# Patient Record
Sex: FEMALE | Race: WHITE | Employment: PART TIME | URBAN - METROPOLITAN AREA
[De-identification: names, ages, dates, MRNs, and addresses within clinical notes are randomized per-mention and may not be internally consistent; named-entity substitution may affect disease eponyms.]

---

## 2017-01-05 PROCEDURE — 86403 PARTICLE AGGLUT ANTBDY SCRN: CPT | Performed by: INTERNAL MEDICINE

## 2017-06-20 PROCEDURE — 87086 URINE CULTURE/COLONY COUNT: CPT | Performed by: FAMILY MEDICINE

## 2017-06-26 PROCEDURE — 88305 TISSUE EXAM BY PATHOLOGIST: CPT | Performed by: INTERNAL MEDICINE

## 2017-07-31 PROBLEM — N92.6 IRREGULAR MENSTRUAL CYCLE: Status: ACTIVE | Noted: 2017-07-31

## 2017-07-31 PROBLEM — N94.6 DYSMENORRHEA: Status: ACTIVE | Noted: 2017-07-31

## 2017-07-31 PROCEDURE — 88175 CYTOPATH C/V AUTO FLUID REDO: CPT | Performed by: OBSTETRICS & GYNECOLOGY

## 2017-11-01 PROBLEM — M54.50 CHRONIC BILATERAL LOW BACK PAIN WITHOUT SCIATICA: Status: ACTIVE | Noted: 2017-11-01

## 2017-11-01 PROBLEM — M25.552 LEFT HIP PAIN: Status: ACTIVE | Noted: 2017-11-01

## 2017-11-01 PROBLEM — G89.29 CHRONIC BILATERAL LOW BACK PAIN WITHOUT SCIATICA: Status: ACTIVE | Noted: 2017-11-01

## 2018-06-04 PROCEDURE — 86812 HLA TYPING A B OR C: CPT | Performed by: INTERNAL MEDICINE

## 2018-06-04 PROCEDURE — 83516 IMMUNOASSAY NONANTIBODY: CPT | Performed by: INTERNAL MEDICINE

## 2018-06-04 PROCEDURE — 86256 FLUORESCENT ANTIBODY TITER: CPT | Performed by: INTERNAL MEDICINE

## 2018-06-04 PROCEDURE — 86334 IMMUNOFIX E-PHORESIS SERUM: CPT | Performed by: INTERNAL MEDICINE

## 2018-06-04 PROCEDURE — 83883 ASSAY NEPHELOMETRY NOT SPEC: CPT | Performed by: INTERNAL MEDICINE

## 2018-06-04 PROCEDURE — 84165 PROTEIN E-PHORESIS SERUM: CPT | Performed by: INTERNAL MEDICINE

## 2018-06-04 PROCEDURE — 86200 CCP ANTIBODY: CPT | Performed by: INTERNAL MEDICINE

## 2018-06-04 PROCEDURE — 82784 ASSAY IGA/IGD/IGG/IGM EACH: CPT | Performed by: INTERNAL MEDICINE

## 2018-06-04 PROCEDURE — 83970 ASSAY OF PARATHORMONE: CPT | Performed by: INTERNAL MEDICINE

## 2018-07-29 PROBLEM — G89.29 CHRONIC RIGHT FLANK PAIN: Status: ACTIVE | Noted: 2018-07-29

## 2018-07-29 PROBLEM — R10.9 CHRONIC RIGHT FLANK PAIN: Status: ACTIVE | Noted: 2018-07-29

## 2018-08-22 PROBLEM — G89.29 CHRONIC BILATERAL LOW BACK PAIN WITHOUT SCIATICA: Status: RESOLVED | Noted: 2017-11-01 | Resolved: 2018-08-22

## 2018-08-22 PROBLEM — M54.50 CHRONIC BILATERAL LOW BACK PAIN WITHOUT SCIATICA: Status: RESOLVED | Noted: 2017-11-01 | Resolved: 2018-08-22

## 2019-04-12 PROCEDURE — 86704 HEP B CORE ANTIBODY TOTAL: CPT | Performed by: INTERNAL MEDICINE

## 2019-04-12 PROCEDURE — 86480 TB TEST CELL IMMUN MEASURE: CPT | Performed by: INTERNAL MEDICINE

## 2019-04-12 PROCEDURE — 80074 ACUTE HEPATITIS PANEL: CPT | Performed by: INTERNAL MEDICINE

## 2022-11-23 ENCOUNTER — OFFICE VISIT (OUTPATIENT)
Dept: OBGYN CLINIC | Facility: CLINIC | Age: 32
End: 2022-11-23
Payer: COMMERCIAL

## 2022-11-23 VITALS
HEART RATE: 123 BPM | SYSTOLIC BLOOD PRESSURE: 127 MMHG | HEIGHT: 67 IN | WEIGHT: 192 LBS | DIASTOLIC BLOOD PRESSURE: 83 MMHG | BODY MASS INDEX: 30.13 KG/M2

## 2022-11-23 DIAGNOSIS — N91.4 SECONDARY OLIGOMENORRHEA: ICD-10-CM

## 2022-11-23 DIAGNOSIS — N94.6 DYSMENORRHEA: ICD-10-CM

## 2022-11-23 DIAGNOSIS — Z01.419 ENCOUNTER FOR ANNUAL ROUTINE GYNECOLOGICAL EXAMINATION: Primary | ICD-10-CM

## 2022-11-23 PROCEDURE — 87624 HPV HI-RISK TYP POOLED RSLT: CPT | Performed by: STUDENT IN AN ORGANIZED HEALTH CARE EDUCATION/TRAINING PROGRAM

## 2022-11-23 PROCEDURE — 3074F SYST BP LT 130 MM HG: CPT | Performed by: STUDENT IN AN ORGANIZED HEALTH CARE EDUCATION/TRAINING PROGRAM

## 2022-11-23 PROCEDURE — 99204 OFFICE O/P NEW MOD 45 MIN: CPT | Performed by: STUDENT IN AN ORGANIZED HEALTH CARE EDUCATION/TRAINING PROGRAM

## 2022-11-23 PROCEDURE — 3008F BODY MASS INDEX DOCD: CPT | Performed by: STUDENT IN AN ORGANIZED HEALTH CARE EDUCATION/TRAINING PROGRAM

## 2022-11-23 PROCEDURE — 99385 PREV VISIT NEW AGE 18-39: CPT | Performed by: STUDENT IN AN ORGANIZED HEALTH CARE EDUCATION/TRAINING PROGRAM

## 2022-11-23 PROCEDURE — 3079F DIAST BP 80-89 MM HG: CPT | Performed by: STUDENT IN AN ORGANIZED HEALTH CARE EDUCATION/TRAINING PROGRAM

## 2022-11-23 RX ORDER — DROSPIRENONE AND ETHINYL ESTRADIOL 0.03MG-3MG
1 KIT ORAL DAILY
Qty: 28 TABLET | Refills: 12 | Status: SHIPPED | OUTPATIENT
Start: 2022-11-23 | End: 2023-11-23

## 2022-11-25 LAB — HPV I/H RISK 1 DNA SPEC QL NAA+PROBE: NEGATIVE

## 2022-11-30 ENCOUNTER — LAB ENCOUNTER (OUTPATIENT)
Dept: LAB | Age: 32
End: 2022-11-30
Attending: STUDENT IN AN ORGANIZED HEALTH CARE EDUCATION/TRAINING PROGRAM
Payer: COMMERCIAL

## 2022-11-30 DIAGNOSIS — N91.4 SECONDARY OLIGOMENORRHEA: ICD-10-CM

## 2022-11-30 LAB
DHEA-S SERPL-MCNC: 64.6 UG/DL
EST. AVERAGE GLUCOSE BLD GHB EST-MCNC: 105 MG/DL (ref 68–126)
ESTRADIOL SERPL-MCNC: 52.7 PG/ML
FSH SERPL-ACNC: 6.5 MIU/ML
HBA1C MFR BLD: 5.3 % (ref ?–5.7)
PROLACTIN SERPL-MCNC: 10.2 NG/ML
T4 FREE SERPL-MCNC: 1 NG/DL (ref 0.8–1.7)
TSI SER-ACNC: 2.07 MIU/ML (ref 0.36–3.74)

## 2022-11-30 PROCEDURE — 84403 ASSAY OF TOTAL TESTOSTERONE: CPT | Performed by: STUDENT IN AN ORGANIZED HEALTH CARE EDUCATION/TRAINING PROGRAM

## 2022-11-30 PROCEDURE — 83001 ASSAY OF GONADOTROPIN (FSH): CPT | Performed by: STUDENT IN AN ORGANIZED HEALTH CARE EDUCATION/TRAINING PROGRAM

## 2022-11-30 PROCEDURE — 82627 DEHYDROEPIANDROSTERONE: CPT | Performed by: STUDENT IN AN ORGANIZED HEALTH CARE EDUCATION/TRAINING PROGRAM

## 2022-11-30 PROCEDURE — 84146 ASSAY OF PROLACTIN: CPT | Performed by: STUDENT IN AN ORGANIZED HEALTH CARE EDUCATION/TRAINING PROGRAM

## 2022-11-30 PROCEDURE — 83036 HEMOGLOBIN GLYCOSYLATED A1C: CPT | Performed by: STUDENT IN AN ORGANIZED HEALTH CARE EDUCATION/TRAINING PROGRAM

## 2022-11-30 PROCEDURE — 84443 ASSAY THYROID STIM HORMONE: CPT | Performed by: STUDENT IN AN ORGANIZED HEALTH CARE EDUCATION/TRAINING PROGRAM

## 2022-11-30 PROCEDURE — 84402 ASSAY OF FREE TESTOSTERONE: CPT | Performed by: STUDENT IN AN ORGANIZED HEALTH CARE EDUCATION/TRAINING PROGRAM

## 2022-11-30 PROCEDURE — 82670 ASSAY OF TOTAL ESTRADIOL: CPT | Performed by: STUDENT IN AN ORGANIZED HEALTH CARE EDUCATION/TRAINING PROGRAM

## 2022-11-30 PROCEDURE — 83498 ASY HYDROXYPROGESTERONE 17-D: CPT | Performed by: STUDENT IN AN ORGANIZED HEALTH CARE EDUCATION/TRAINING PROGRAM

## 2022-11-30 PROCEDURE — 84439 ASSAY OF FREE THYROXINE: CPT | Performed by: STUDENT IN AN ORGANIZED HEALTH CARE EDUCATION/TRAINING PROGRAM

## 2022-12-01 ENCOUNTER — PATIENT MESSAGE (OUTPATIENT)
Dept: OBGYN CLINIC | Facility: CLINIC | Age: 32
End: 2022-12-01

## 2022-12-03 LAB — 17-HYDROPROGESTERONE QNT: 110.98 NG/DL

## 2022-12-06 NOTE — TELEPHONE ENCOUNTER
Per Dr. Ihsan Edwards 12/5/22 note regarding kidney function and potassium:  Patient's kidney function is normal, so even with the frequent naproxen use, her kidneys should still be able to regulate her potassium levels fine. OK for pt to start the OCPs. Pt informed okay to continue OCPs per Dr. Chinmay Lopez.  Pt verbalizes understanding.

## 2022-12-07 LAB
TESTOSTERONE, FREE, S: 0.83 NG/DL
TESTOSTERONE, TOTAL, S: 34 NG/DL

## 2023-01-01 ENCOUNTER — PATIENT MESSAGE (OUTPATIENT)
Dept: OBGYN CLINIC | Facility: CLINIC | Age: 33
End: 2023-01-01

## 2023-01-03 NOTE — TELEPHONE ENCOUNTER
From: John Lara  To: Henry Miguel MD  Sent: 1/1/2023 3:28 PM CST  Subject: cramping for 10+ days       Colette had intense menstrual cramping for the past 10 days without a period ( I do think I will get it in a day or two). Gingere taken my 500 mg naproxen twice a day and have not experienced that much relief from it. Are there any other options for birth control pills other than the sandy that I can take? I am still uncomfortable with the sandy since I do take nsaids at least 3-10 times/ week due to my arthritis. Or are there any other options to help the intense cramping? This cramping issue usually happens to me for two weeks before a period (when I get it) and has been getting worse over the past year or so.

## 2023-01-04 RX ORDER — LEVONORGESTREL AND ETHINYL ESTRADIOL 0.15-0.03
1 KIT ORAL DAILY
Qty: 91 TABLET | Refills: 3 | Status: SHIPPED | OUTPATIENT
Start: 2023-01-04

## 2023-01-04 NOTE — TELEPHONE ENCOUNTER
Discussed per Dr. Yuliya Abarca 91-Day 0.15-0.03 MG Oral Tab This one you take for 3 months at a time, designed to skip periods to help reduce cramping. She can even skip the period at the end of the 3 month pill pack, too. Rxed this to pharmacy. Another alternative is the mirena IUD -- will release a much smaller amount of hormone into her bloodstream so theoretically has less side effects on mood, etc. Can take a few months to work but eventually can make periods stop altogether. If interested in that can schedule for IUD placement. Pt plans to try [facility] at this time. RX sent to pharmacy    Patient verbalized understanding, agreed to and intend to comply with plan of care.

## 2023-01-23 ENCOUNTER — TELEPHONE (OUTPATIENT)
Facility: CLINIC | Age: 33
End: 2023-01-23

## 2023-01-23 NOTE — TELEPHONE ENCOUNTER
Spoke with patient. She started new OCP on 1/8/23. Since day 13 to now she has been experiencing severe cramps that come and go through out the day. Was taking aleve without relief. Will route message but in the meantime Instructed to switch to ibuprofen 600 mg every 6 hours with food and keep her bladder empty. Understanding verbalized.

## 2023-01-23 NOTE — TELEPHONE ENCOUNTER
Spoke with patient. She has not noticed any changes in her bowel habits. She is aware some birth control pills can cause bloating/constipation, so cramps may be related to this. Instructed to try gas x to see if that elevates/helps th issue. If the cramps are pretty severe, persistent, and the only new change has been the new birth control, there is the option of just discontinuing the OCPs. Another option for controlling her periods that may have less side effects could be the mirena IUD. She is not interested in the IUD. She would like to try a couple more days of the OCP. If cramping continues will stop. Aware irregular bleeding may occur for up to 3 months. Will call if any additional concerns. Understanding verbalized.

## 2023-01-23 NOTE — TELEPHONE ENCOUNTER
Pt states she is still experiencing really bad cramping by day 13 on BC that OTC pain meds are not helping; pls advise

## 2023-03-20 ENCOUNTER — TELEPHONE (OUTPATIENT)
Facility: CLINIC | Age: 33
End: 2023-03-20

## 2023-03-20 ENCOUNTER — PATIENT MESSAGE (OUTPATIENT)
Dept: OBGYN CLINIC | Facility: CLINIC | Age: 33
End: 2023-03-20

## 2023-03-21 NOTE — TELEPHONE ENCOUNTER
Spoke with pt. I let her know that most times it can take up to 3 months for her body to get use to the new pill but sometimes it may take a little long. Told to continue on the pill as directed to see if it bleeding improves in the next 2-3 months. To call if no improvement and we can get a message to Dr. Jodie Yarbrough to possibly change BCP. To call or go to the ER if soaking a pad an hour, SOB, lightheadedness, or dizziness. Verbalized understanding.

## 2023-04-10 ENCOUNTER — OFFICE VISIT (OUTPATIENT)
Dept: OBGYN CLINIC | Facility: CLINIC | Age: 33
End: 2023-04-10
Payer: COMMERCIAL

## 2023-04-10 VITALS
DIASTOLIC BLOOD PRESSURE: 85 MMHG | HEART RATE: 93 BPM | WEIGHT: 185.19 LBS | HEIGHT: 67 IN | SYSTOLIC BLOOD PRESSURE: 120 MMHG | BODY MASS INDEX: 29.07 KG/M2

## 2023-04-10 DIAGNOSIS — Z30.41 USES ORAL CONTRACEPTION: Primary | ICD-10-CM

## 2023-04-10 DIAGNOSIS — R10.2 ADNEXAL PAIN: ICD-10-CM

## 2023-04-10 DIAGNOSIS — N94.6 DYSMENORRHEA: ICD-10-CM

## 2023-04-10 RX ORDER — NORETHINDRONE ACETATE AND ETHINYL ESTRADIOL 1MG-20(21)
1 KIT ORAL DAILY
Qty: 28 TABLET | Refills: 12 | Status: SHIPPED | OUTPATIENT
Start: 2023-04-10 | End: 2024-04-09

## 2023-04-20 ENCOUNTER — HOSPITAL ENCOUNTER (OUTPATIENT)
Dept: ULTRASOUND IMAGING | Age: 33
Discharge: HOME | End: 2023-04-20
Payer: COMMERCIAL

## 2023-04-20 DIAGNOSIS — R10.2 ADNEXAL PAIN: ICD-10-CM

## 2023-04-20 DIAGNOSIS — N94.6 DYSMENORRHEA: ICD-10-CM

## 2023-04-20 PROCEDURE — 76856 US EXAM PELVIC COMPLETE: CPT

## 2023-04-20 PROCEDURE — 76830 TRANSVAGINAL US NON-OB: CPT

## 2023-11-29 ENCOUNTER — OFFICE VISIT (OUTPATIENT)
Dept: OBGYN CLINIC | Facility: CLINIC | Age: 33
End: 2023-11-29
Payer: COMMERCIAL

## 2023-11-29 VITALS
DIASTOLIC BLOOD PRESSURE: 82 MMHG | HEIGHT: 67 IN | WEIGHT: 180.75 LBS | HEART RATE: 101 BPM | BODY MASS INDEX: 28.37 KG/M2 | SYSTOLIC BLOOD PRESSURE: 122 MMHG

## 2023-11-29 DIAGNOSIS — N94.6 DYSMENORRHEA: ICD-10-CM

## 2023-11-29 DIAGNOSIS — Z01.419 ENCOUNTER FOR ANNUAL ROUTINE GYNECOLOGICAL EXAMINATION: Primary | ICD-10-CM

## 2023-11-29 DIAGNOSIS — N91.4 SECONDARY OLIGOMENORRHEA: ICD-10-CM

## 2023-11-29 PROCEDURE — 3079F DIAST BP 80-89 MM HG: CPT | Performed by: STUDENT IN AN ORGANIZED HEALTH CARE EDUCATION/TRAINING PROGRAM

## 2023-11-29 PROCEDURE — 99395 PREV VISIT EST AGE 18-39: CPT | Performed by: STUDENT IN AN ORGANIZED HEALTH CARE EDUCATION/TRAINING PROGRAM

## 2023-11-29 PROCEDURE — 3008F BODY MASS INDEX DOCD: CPT | Performed by: STUDENT IN AN ORGANIZED HEALTH CARE EDUCATION/TRAINING PROGRAM

## 2023-11-29 PROCEDURE — 3074F SYST BP LT 130 MM HG: CPT | Performed by: STUDENT IN AN ORGANIZED HEALTH CARE EDUCATION/TRAINING PROGRAM

## 2024-06-27 ENCOUNTER — OFFICE VISIT (OUTPATIENT)
Dept: OBGYN CLINIC | Facility: CLINIC | Age: 34
End: 2024-06-27

## 2024-06-27 VITALS
DIASTOLIC BLOOD PRESSURE: 64 MMHG | HEIGHT: 67 IN | WEIGHT: 185.19 LBS | HEART RATE: 64 BPM | SYSTOLIC BLOOD PRESSURE: 100 MMHG | BODY MASS INDEX: 29.07 KG/M2

## 2024-06-27 DIAGNOSIS — N94.6 DYSMENORRHEA: Primary | ICD-10-CM

## 2024-06-27 DIAGNOSIS — R10.2 PELVIC PAIN: ICD-10-CM

## 2024-06-27 PROCEDURE — 99214 OFFICE O/P EST MOD 30 MIN: CPT | Performed by: STUDENT IN AN ORGANIZED HEALTH CARE EDUCATION/TRAINING PROGRAM

## 2024-06-27 PROCEDURE — 3078F DIAST BP <80 MM HG: CPT | Performed by: STUDENT IN AN ORGANIZED HEALTH CARE EDUCATION/TRAINING PROGRAM

## 2024-06-27 PROCEDURE — 3074F SYST BP LT 130 MM HG: CPT | Performed by: STUDENT IN AN ORGANIZED HEALTH CARE EDUCATION/TRAINING PROGRAM

## 2024-06-27 PROCEDURE — 3008F BODY MASS INDEX DOCD: CPT | Performed by: STUDENT IN AN ORGANIZED HEALTH CARE EDUCATION/TRAINING PROGRAM

## 2024-06-27 RX ORDER — NORETHINDRONE ACETATE AND ETHINYL ESTRADIOL .02; 1 MG/1; MG/1
1 TABLET ORAL DAILY
Qty: 84 TABLET | Refills: 4 | Status: SHIPPED | OUTPATIENT
Start: 2024-06-27 | End: 2025-06-27

## 2024-06-27 NOTE — PROGRESS NOTES
[facility]  Obstetrics and Gynecology   Established Patient Visit    Chief complaint:   Chief Complaint   Patient presents with    Menstrual Problem     Cramping a lot with every cycle          HPI: Katia Roberts is a 33 year old  with Patient's last menstrual period was 2024 (exact date).      Hx dysmenorrhea, often not even improved with 500mg naxproxen she would take for arthritis  Pt had msged 5/10/24 re options for dysmenorrhea & had been actually having bad cramping without period   Pt having regular menses q35d. About 2wk before period will start she will start to have cramping. Gets worse and worse then CD1-2 is the worst, then cramps improve.  Years ago was on microgestin and that helped. Thinks didn't have severe side effects  TK had actually rxed this a year ago, pt thinks she maybe didn't ever start it  Wouldn't want IUD - nervous about that causing pain,has heard about bad experiences from friends    We did her annual exam 2023 - at that time she had dced OCP (first had prolonged VB with seasonique, and actually had cramps and anxiety), had been having regular monthly menses off OCP  We had also previously done US 2023 - wnl    pap 2022 NILM HPV neg  Pmhx seronegative spondylitis (Sacroiliitis) on immunosuppressive meds (Cosentyx)      Meds:  Current Outpatient Medications on File Prior to Visit   Medication Sig Dispense Refill    sertraline (ZOLOFT) 25 MG Oral Tab Take 1 tablet (25 mg total) by mouth every morning. 90 tablet 0    Secukinumab (COSENTYX SENSOREADY PEN) 150 MG/ML Subcutaneous Solution Auto-injector Inject 150 mg into the skin every 28 days. 3 each 0    naproxen 500 MG Oral Tab Take 1 tablet (500 mg total) by mouth 2 (two) times daily with meals. 180 tablet 0    cyclobenzaprine 10 MG Oral Tab Take 1 tablet (10 mg total) by mouth 2 (two) times daily as needed. 30 tablet 0    Cholecalciferol (VITAMIN D) 2000 UNITS Oral Cap Take 2 capsules (4,000 Units total)  by mouth daily.      Norethin Ace-Eth Estrad-FE (MICROGESTIN FE ) 1-20 MG-MCG Oral Tab Take 1 tablet by mouth daily. (Patient not taking: Reported on 2023) 28 tablet 12    mupirocin 2 % External Ointment Apply to affected area corner of mouth tid x 10 days. 30 g 0    albuterol (PROAIR HFA) 108 (90 Base) MCG/ACT Inhalation Aero Soln Inhale 2 puffs into the lungs every 6 (six) hours as needed. (Patient not taking: Reported on 3/7/2022) 1 each 0     No current facility-administered medications on file prior to visit.         PHYSICAL EXAM:     Vitals:    24 1135   BP: 100/64   Pulse: 64   Weight: 185 lb 3 oz (84 kg)   Height: 67\"       Body mass index is 29 kg/m².    Exam:  General: NAD, appears well  Pelvic exam: deferred    Assessment & Plan:     Katia Roberts is a 33 year old  female here for      Diagnoses and all orders for this visit:    Dysmenorrhea  -     Norethindrone Acet-Ethinyl Est 1-20 MG-MCG Oral Tab; Take 1 tablet by mouth daily.    Pelvic pain  -     Norethindrone Acet-Ethinyl Est 1-20 MG-MCG Oral Tab; Take 1 tablet by mouth daily.       Discussed potential dx including endometriosis, primary dysmenorrhea. Discussed pathophysiology and how causes dysmenorrhea  Discussed in many cases of endometriosis pelvic US is normal unless there are obvious signs e.g. endometrioma. D/w pt that often confirming dx of endometriosis requires laparoscopy so typically start treatment empirically with hormonal medications  Pt does not think she's quite at the point of scheduling laparoscopy  Would like to try going back on microgestin first. Going to consider laparoscopy, will let us know  Discussed that if pt has severe stage 3-4 endo on laparoscopy that I would not feel comfortable resecting that myself and would need to refer to specialist. Discussed option of going right to consult with MIGS specialist      Janae Monroe MD  [facility] - OBGYN

## 2024-12-04 ENCOUNTER — OFFICE VISIT (OUTPATIENT)
Facility: CLINIC | Age: 34
End: 2024-12-04
Payer: COMMERCIAL

## 2024-12-04 VITALS
DIASTOLIC BLOOD PRESSURE: 68 MMHG | BODY MASS INDEX: 29.98 KG/M2 | SYSTOLIC BLOOD PRESSURE: 102 MMHG | WEIGHT: 191 LBS | HEIGHT: 67 IN | HEART RATE: 99 BPM

## 2024-12-04 DIAGNOSIS — N94.6 DYSMENORRHEA: ICD-10-CM

## 2024-12-04 DIAGNOSIS — Z01.419 ENCOUNTER FOR ANNUAL ROUTINE GYNECOLOGICAL EXAMINATION: Primary | ICD-10-CM

## 2024-12-04 PROCEDURE — 3074F SYST BP LT 130 MM HG: CPT | Performed by: STUDENT IN AN ORGANIZED HEALTH CARE EDUCATION/TRAINING PROGRAM

## 2024-12-04 PROCEDURE — 99459 PELVIC EXAMINATION: CPT | Performed by: STUDENT IN AN ORGANIZED HEALTH CARE EDUCATION/TRAINING PROGRAM

## 2024-12-04 PROCEDURE — 3078F DIAST BP <80 MM HG: CPT | Performed by: STUDENT IN AN ORGANIZED HEALTH CARE EDUCATION/TRAINING PROGRAM

## 2024-12-04 PROCEDURE — 99395 PREV VISIT EST AGE 18-39: CPT | Performed by: STUDENT IN AN ORGANIZED HEALTH CARE EDUCATION/TRAINING PROGRAM

## 2024-12-04 PROCEDURE — 3008F BODY MASS INDEX DOCD: CPT | Performed by: STUDENT IN AN ORGANIZED HEALTH CARE EDUCATION/TRAINING PROGRAM

## 2024-12-04 RX ORDER — ACETAMINOPHEN AND CODEINE PHOSPHATE 120; 12 MG/5ML; MG/5ML
0.35 SOLUTION ORAL DAILY
Qty: 84 TABLET | Refills: 3 | Status: SHIPPED | OUTPATIENT
Start: 2024-12-04 | End: 2025-12-04

## 2024-12-04 NOTE — PATIENT INSTRUCTIONS
Reproductive endocrinology & infertility (PHILL) and Minimally invasive gynecologic surgery (MIGS) specialists ([facility])    [facility] [place]  Ashleigh Markham, Adithya Yoo, Alexus Khan   [email or web address]    [facility] OFF[facility]   [address], [address]  [place] [address]  P:[phone]   F: [phone]    [facility] OFFICE   [address]  [place] [address]  P: [phone]  F: [phone]

## 2024-12-04 NOTE — PROGRESS NOTES
[facility]  Obstetrics and Gynecology   History & Physical    Chief complaint:   Chief Complaint   Patient presents with    Wellness Visit     Last pap 22 neg     Other     Reviewed Preventative/Wellness form with patient.         HPI: Katia Roberts is a 33 year old  with Patient's last menstrual period was 10/27/2024 (exact date).      Pt here for annual exam  Seen previously for dysmenorrhea despite 500mg naproxen she takes for arthritis  We started her on OCP 2024, also discussed option of diagnostic laparoscopy to evaluate for endometriosis (vs referral to [facility])  Pt took Ocp for 10 days then developed a severe migraine so stopped it  Cramps basically the same, pretty severe, wake her up from sleep, may only have 1 good week per month    Menses had previously been regular, q35d before OCP. (When I had initially seen pt a few years ago she had irregular menses and we did do PCOS labs which were normal, then menses got more regular)      Hx Prior Abnormal Pap: No  Pap Date: 22  Pap Result Notes: neg    PMHx/Surghx reviewed, below. Of note: seronegative spondylitis/sacroiliitis on immunosuppression  Fhx: no family hx breast/ovarian/colon/uterine cancers    PCP: Bela Arceo MD    Review of Systems:  Constitutional:  Denies fatigue, night sweats, hot flashes  Eyes:  denies blurred or double vision  Cardiovascular:  denies chest pain or palpitations  Respiratory:  denies shortness of breath  Gastrointestinal:  denies heartburn, abdominal pain, diarrhea or constipation  Genitourinary:  denies dysuria, incontinence, abnormal vaginal discharge, vaginal itching  Musculoskeletal:  +back pain.  Skin/Breast:  Denies any breast pain, lumps, or discharge.   Neurological:  denies headaches, extremity weakness or numbness.  Psychiatric: denies depression or anxiety.  Endocrine:   denies excessive thirst or urination.  Heme/Lymph:  denies history of anemia, easy bruising or bleeding.    OB  History:  OB History    Para Term  AB Living   0 0 0 0 0 0   SAB IAB Ectopic Multiple Live Births   0 0 0 0 0       Meds:  Current Outpatient Medications on File Prior to Visit   Medication Sig Dispense Refill    sertraline (ZOLOFT) 50 MG Oral Tab Take 0.5 tablets (25 mg total) by mouth every morning.      Secukinumab (COSENTYX SENSOREADY PEN) 150 MG/ML Subcutaneous Solution Auto-injector Inject 150 mg into the skin every 28 days. 3 each 0    albuterol (PROAIR HFA) 108 (90 Base) MCG/ACT Inhalation Aero Soln Inhale 2 puffs into the lungs every 6 (six) hours as needed. 1 each 0    naproxen 500 MG Oral Tab Take 1 tablet (500 mg total) by mouth 2 (two) times daily with meals. 180 tablet 0    cyclobenzaprine 10 MG Oral Tab Take 1 tablet (10 mg total) by mouth 2 (two) times daily as needed. 30 tablet 0    Cholecalciferol (VITAMIN D) 2000 UNITS Oral Cap Take 2 capsules (4,000 Units total) by mouth daily.      Ketorolac Tromethamine 10 MG Oral Tab For severe menstrual cramps, take 20mg for first dose then 10mg every 6 hours as needed. May use for up to 5 days a month (Patient not taking: Reported on 2024) 30 tablet 5    Norethindrone Acet-Ethinyl Est 1-20 MG-MCG Oral Tab Take 1 tablet by mouth daily. (Patient not taking: Reported on 2024) 84 tablet 4    mupirocin 2 % External Ointment Apply to affected area corner of mouth tid x 10 days. (Patient not taking: Reported on 2024) 30 g 0     No current facility-administered medications on file prior to visit.       All:  Allergies   Allergen Reactions    Bactrim     Clindamycin Hcl     Sulfa Antibiotics UNKNOWN     rash    Pcns [Penicillins] RASH       PMH:  Past Medical History:    Anxiety    Anxiety    Chronic rhinitis    Dysmenorrhea    Endometriosis    Extrinsic asthma, unspecified    Seronegative spondylitis (HCC)       PSH:  History reviewed. No pertinent surgical history.    Social History:  Social History     Socioeconomic History    Marital  status: Single     Spouse name: Not on file    Number of children: Not on file    Years of education: Not on file    Highest education level: Not on file   Occupational History    Not on file   Tobacco Use    Smoking status: Never    Smokeless tobacco: Never   Vaping Use    Vaping status: Never Used   Substance and Sexual Activity    Alcohol use: Not Currently    Drug use: No    Sexual activity: Not Currently   Other Topics Concern    Caffeine Concern No    Exercise Yes    Seat Belt Yes    Special Diet No    Stress Concern No    Weight Concern Yes     Comment: Workout frequently but never lose weight   Social History Narrative    Not on file     Social Drivers of Health     Financial Resource Strain: Not on file   Food Insecurity: Not on file   Transportation Needs: Not on file   Physical Activity: Not on file   Stress: Not on file   Social Connections: Not on file   Housing Stability: Not on file        Family History:  Family History   Problem Relation Age of Onset    Other (Other) Mother         thyroid    Anxiety Sister     Depression Sister     Anxiety Brother     Depression Brother     Anxiety Paternal Grandmother     High Cholesterol Paternal Grandmother     Hypertension Paternal Grandmother     Cancer Paternal Grandmother         Lung and bone cancer    Prostate Cancer Paternal Grandfather     High Cholesterol Paternal Grandfather     Other (Other) Maternal Grandfather         RA    Other (Other) Maternal Aunt         SLE       PHYSICAL EXAM:     Vitals:    12/04/24 1449   BP: 102/68   Pulse: 99   Weight: 191 lb (86.6 kg)   Height: 67\"         Body mass index is 29.91 kg/m².      Constitutional: well developed, well nourished  Head/Face: normocephalic  Breast: normal without palpable masses, tenderness, asymmetry, nipple discharge, nipple retraction or skin changes  Abdomen:  soft, nontender, nondistended, no masses  Skin/Hair: no unusual rashes or bruises  Extremities: no edema, no cyanosis  Psychiatric:   Oriented to time, place, person and situation. Appropriate mood and affect    Pelvic Exam:  External Genitalia: normal appearance, hair distribution, and no lesions  Urethral Meatus:  normal in size, location, without lesions and prolapse  Bladder:  No fullness, masses or tenderness  Vagina:  Normal appearance without lesions, no abnormal discharge  Cervix:  Normal without tenderness on motion  Uterus: normal in size, contour, position, mobility, without tenderness  Adnexa: normal without masses or tenderness  Perineum: normal  Anus: no hemorrhoids     Assessment & Plan:     Katia Roberts is a 33 year old      Diagnoses and all orders for this visit:    Encounter for annual routine gynecological examination    Dysmenorrhea  -     Norethindrone 0.35 MG Oral Tab; Take 1 tablet (0.35 mg total) by mouth daily.    Would like to try progestin-only pill  D/w pt mini pill may have spotting/IMB as side effect, if happens can try slynd with coupon code  Pt also given info for MIGS specialists, she may be leaning towards laparoscopy for possible endometriosis     Pap - UTD, 2022 NILM HPV neg - q3y due to immunosuppression  HPV vax - doesn't think she got this but deferred -pt uncertain whether this is OK with her immunosuppressive meds          Janae Monroe MD  [facility] - OBGYN

## 2025-01-07 ENCOUNTER — TELEPHONE (OUTPATIENT)
Facility: CLINIC | Age: 35
End: 2025-01-07

## 2025-01-07 NOTE — TELEPHONE ENCOUNTER
Patient started taking Norethindrone for a month and had bad cramps and spotting. Can expect to have irregular cycle and symptoms should get better. If sign and symptoms does not get better after 2 months to call office. Patient verbalized understanding, agreed to and intend to comply with plan of care.

## 2025-01-07 NOTE — TELEPHONE ENCOUNTER
Patient stated she has questions regarding her medication  Norethindrone that she was prescribed 12/4.  Please advise